# Patient Record
Sex: FEMALE | Race: WHITE
[De-identification: names, ages, dates, MRNs, and addresses within clinical notes are randomized per-mention and may not be internally consistent; named-entity substitution may affect disease eponyms.]

---

## 2017-09-22 ENCOUNTER — HOSPITAL ENCOUNTER (EMERGENCY)
Dept: HOSPITAL 10 - FTE | Age: 32
Discharge: HOME | End: 2017-09-22
Payer: MEDICAID

## 2017-09-22 VITALS — WEIGHT: 209.44 LBS | HEIGHT: 55 IN | BODY MASS INDEX: 48.47 KG/M2

## 2017-09-22 VITALS — HEART RATE: 90 BPM

## 2017-09-22 DIAGNOSIS — O99.89: Primary | ICD-10-CM

## 2017-09-22 DIAGNOSIS — R06.02: ICD-10-CM

## 2017-09-22 DIAGNOSIS — Z3A.16: ICD-10-CM

## 2017-09-22 PROCEDURE — 94664 DEMO&/EVAL PT USE INHALER: CPT

## 2017-09-22 NOTE — ERD
ER Documentation


Chief Complaint


Date/Time


DATE: 17 


TIME: 22:55


Chief Complaint


SOB 16 WEEKS PREG





HPI


This patient is a 32-year-old female who is  LC1 currently reportedly 16 

weeks pregnant presenting to the emergency department with complaints of 

shortness of breath since 3 PM today.  Associated symptoms include sore throat 

and bilateral ear pain.  Symptoms are constant and worsening.  She only takes 

medication for allergies but no other medication.  She denies fevers, chills, 

or other symptoms currently.





ROS


All systems reviewed and are negative except as per history of present illness.





Medications


Home Meds


Active Scripts


Fluticasone Propionate (Flonase Allergy Relief) 9.9 Ml [place].susp, 1 SPRAY 

NASAL DAILY, #1 BOTTLE


   TO EACH NOSTRIL


   Prov:TRISHA LIRA PA-C         17


Albuterol Sulfate* (Ventolin HFA*) 18 Gm Hfa.aer.ad, 2 PUFF INHALATION Q4H, #1 

INHALER


   Prov:TRISHA LIRA PA-C         17





PMhx/Soc


History of Surgery:  No ()


Hx Alcohol Use:  No


Hx Substance Use:  No


Hx Tobacco Use:  No


Smoking Status:  Never smoker





Physical Exam


Vitals





Vital Signs








  Date Time  Temp Pulse Resp B/P Pulse Ox O2 Delivery O2 Flow Rate FiO2


 


17 20:25  90   100 Room Air  


 


17 19:31  71 18  99   21


 


17 17:17 98.0 81 18 135/80 99   








Physical Exam


Const: Nontoxic, well-appearing female in no acute distress.


Head:   Atraumatic 


Eyes:    Normal Conjunctiva


ENT:    Normal External Ears, Nose and Mouth.


Neck:               Full range of motion..~ No meningismus.


Resp:   Shallow inspiratory effort, but clear lung sounds bilaterally.  No 

wheezing.  No rales, rhonchi, or crackles noted.  No signs of respiratory 

distress.  99% on room air.


Cardio:    Regular rate and rhythm, no murmurs


Abd:    Gravid abdomen, soft, non tender, non distended. Normal bowel sounds


Skin:    No petechiae or rashes


Back:    No midline or flank tenderness


Ext:    No cyanosis, or edema


Neur:    Awake and alert


Psych:    Normal Mood and Affect


Results 24 hrs





 Current Medications








 Medications


  (Trade)  Dose


 Ordered  Sig/Tessa


 Route


 PRN Reason  Start Time


 Stop Time Status Last Admin


Dose Admin


 


 Albuterol


  (Proventil


 0.083% (Neb))  2.5 mg  ONCE  STAT


 HHN


   17 19:21


 17 19:23 DC 17 19:31


 


 


 Ipratropium


 Bromide


  (Atrovent 0.02%


  (Neb))  0.5 mg  ONCE  ONCE


 HHN


   17 19:30


 17 19:31 DC 17 19:31


 


 


 Prednisone


  (Prednisone)  40 mg  ONCE  ONCE


 PO


   17 19:30


 17 19:31 DC 17 19:32


 











Procedures/MDM


32-year-old female presents to the emergency department with complaints of 

shortness of breath.  She is 16 weeks pregnant, but denies all obstetrical 

complaints.  She was given breathing treatment with ipratropium and albuterol 

in the department as well as p.o. prednisone and is feeling much improved on 

reevaluation.  Symptoms may be due to a asthma exacerbation.  Had very low 

suspicion for pulmonary embolism as the patient's vital signs were stable with 

a pulse of 81, temp of 98.0F, and 99% on room air.  She is feeling much 

improved after treatment in the department.  I do not feel she required further 

workup here.  She was advised to return immediately for any new or worsening 

symptoms and she demonstrated good understanding of this information.  Strict 

ER return precautions were discussed.  Close follow-up with the primary care 

physician was advised.  Suspicion for life-threatening illness at time of 

discharge.





Departure


Diagnosis:  


 Primary Impression:  


 Shortness of breath


Condition:  Fair


Patient Instructions:  Coping with Shortness of Breath: Controlling Stress


Referrals:  


COMMUNITY CLINIC  ()


Nella se ha hecho un examen mdico de control que le indica que no est en vonda 

condicin que requiera tratamiento urgente en el Departamento de Emergencia. Un 

estudio ms profundo y el tratamiento de londono condicin pueden esperar sin ningn 

riesgo hasta que usted sea atendida/o en el consultorio de londono mdico o vonda cl

asif. Es responsabilidad suya arreglar vonda rosy para el seguimiento del fartun. 





MANEJO DE CONDICIONES NO URGENTES EN EL FUTURO


1) Si usted tiene un mdico de atencin primaria:





Usted debera llamar a londono mdico de atencin primaria antes de venir al 

departamento de emergencia. Despus de las horas de consultorio, londono doctor o londono 

asociado/a est disponible por telfono. El mdico o enfermero de ariadne en el 

servicio telefnico puede asesorarle por octavio medio para atender el problema, o 

fartun contrario se puede programar vonda rosy.





2) Si usted no tiene un mdico de atencin primaria:


Llame al mdico o clnica de referencia que aparece abajo mckay las horas de 

consultorio para hacer vonda rosy para que le vean.





CLINICAS:


[facility]  [phone] [address] [facility]  [phone] [address] [facility] [phone]


[address] [facility]  [phone] [address] [facility]   [phone] [address] [facility] [phone] 


[address]





Additional Instructions:  


No mas mejor en 2-3 fletcher, regresar. Mas peor en 24 horas, regresear 

rapidamente. 





Ir a doctor primario in 5-7 fletcher. 





Usar instrucciones cuando lyn medicamento.











TRISHA LIRA PA-C Sep 22, 2017 23:02

## 2018-01-30 ENCOUNTER — HOSPITAL ENCOUNTER (EMERGENCY)
Age: 33
Discharge: HOME | End: 2018-01-30

## 2018-01-30 ENCOUNTER — HOSPITAL ENCOUNTER (EMERGENCY)
Dept: HOSPITAL 91 - E/R | Age: 33
Discharge: HOME | End: 2018-01-30
Payer: MEDICAID

## 2018-01-30 DIAGNOSIS — J02.9: Primary | ICD-10-CM

## 2018-01-30 PROCEDURE — 99283 EMERGENCY DEPT VISIT LOW MDM: CPT

## 2018-03-03 ENCOUNTER — HOSPITAL ENCOUNTER (INPATIENT)
Age: 33
LOS: 3 days | Discharge: HOME | End: 2018-03-06

## 2018-03-03 ENCOUNTER — HOSPITAL ENCOUNTER (INPATIENT)
Dept: HOSPITAL 91 - OBT | Age: 33
LOS: 3 days | Discharge: HOME | End: 2018-03-06
Payer: MEDICAID

## 2018-03-03 DIAGNOSIS — O34.211: Primary | ICD-10-CM

## 2018-03-03 DIAGNOSIS — Z3A.39: ICD-10-CM

## 2018-03-03 LAB
ADD MAN DIFF?: NO
BASOPHIL #: 0 10^3/UL (ref 0–0.1)
BASOPHILS %: 0.3 % (ref 0–2)
EOSINOPHILS #: 0.1 10^3/UL (ref 0–0.5)
EOSINOPHILS %: 1.5 % (ref 0–7)
HEMATOCRIT: 36.8 % (ref 37–47)
HEMOGLOBIN: 12.3 G/DL (ref 12–16)
HEPATITIS B SURFACE ANTIGEN: NEGATIVE
INR: 0.93
LYMPHOCYTES #: 1.8 10^3/UL (ref 0.8–2.9)
LYMPHOCYTES %: 19.3 % (ref 15–51)
MEAN CORPUSCULAR HEMOGLOBIN: 30.8 PG (ref 29–33)
MEAN CORPUSCULAR HGB CONC: 33.4 G/DL (ref 32–37)
MEAN CORPUSCULAR VOLUME: 92.2 FL (ref 82–101)
MEAN PLATELET VOLUME: 11.3 FL (ref 7.4–10.4)
MONOCYTE #: 0.7 10^3/UL (ref 0.3–0.9)
MONOCYTES %: 7.9 % (ref 0–11)
NEUTROPHIL #: 6.5 10^3/UL (ref 1.6–7.5)
NEUTROPHILS %: 70.5 % (ref 39–77)
NUCLEATED RED BLOOD CELLS #: 0 10^3/UL (ref 0–0)
NUCLEATED RED BLOOD CELLS%: 0 /100WBC (ref 0–0)
PARTIAL THROMBOPLASTIN TIME: 25 SEC (ref 25–35)
PLATELET COUNT: 188 10^3/UL (ref 140–415)
PROTIME: 12.6 SEC (ref 11.9–14.9)
PT RATIO: 1
RAPID PLASMA REAGIN: NONREACTIVE
RED BLOOD COUNT: 3.99 10^6/UL (ref 4.2–5.4)
RED CELL DISTRIBUTION WIDTH: 13.8 % (ref 11.5–14.5)
RUPTURE FETAL MEMBRANES: POSITIVE
WHITE BLOOD COUNT: 9.2 10^3/UL (ref 4.8–10.8)

## 2018-03-03 PROCEDURE — 86901 BLOOD TYPING SEROLOGIC RH(D): CPT

## 2018-03-03 PROCEDURE — 76818 FETAL BIOPHYS PROFILE W/NST: CPT

## 2018-03-03 PROCEDURE — 80307 DRUG TEST PRSMV CHEM ANLYZR: CPT

## 2018-03-03 PROCEDURE — 87340 HEPATITIS B SURFACE AG IA: CPT

## 2018-03-03 PROCEDURE — 76815 OB US LIMITED FETUS(S): CPT

## 2018-03-03 PROCEDURE — 85730 THROMBOPLASTIN TIME PARTIAL: CPT

## 2018-03-03 PROCEDURE — 86592 SYPHILIS TEST NON-TREP QUAL: CPT

## 2018-03-03 PROCEDURE — 3E033VJ INTRODUCTION OF OTHER HORMONE INTO PERIPHERAL VEIN, PERCUTANEOUS APPROACH: ICD-10-PCS

## 2018-03-03 PROCEDURE — 86850 RBC ANTIBODY SCREEN: CPT

## 2018-03-03 PROCEDURE — 85610 PROTHROMBIN TIME: CPT

## 2018-03-03 PROCEDURE — 86920 COMPATIBILITY TEST SPIN: CPT

## 2018-03-03 PROCEDURE — 85025 COMPLETE CBC W/AUTO DIFF WBC: CPT

## 2018-03-03 PROCEDURE — 84112 EVAL AMNIOTIC FLUID PROTEIN: CPT

## 2018-03-03 PROCEDURE — 86900 BLOOD TYPING SEROLOGIC ABO: CPT

## 2018-03-03 PROCEDURE — 94760 N-INVAS EAR/PLS OXIMETRY 1: CPT

## 2018-03-03 RX ADMIN — SODIUM CITRATE AND CITRIC ACID MONOHYDRATE 1 ML: 500; 334 SOLUTION ORAL at 16:40

## 2018-03-03 RX ADMIN — AMPICILLIN 1 MLS/HR: 1 INJECTION, POWDER, FOR SOLUTION INTRAMUSCULAR; INTRAVENOUS at 18:30

## 2018-03-03 RX ADMIN — Medication 1 MLS/HR: at 18:15

## 2018-03-03 RX ADMIN — CARBOPROST TROMETHAMINE 1 MCG: 250 INJECTION, SOLUTION INTRAMUSCULAR at 18:56

## 2018-03-03 RX ADMIN — Medication 1 MLS/HR: at 19:10

## 2018-03-03 RX ADMIN — METHYLERGONOVINE MALEATE 1 MG: 0.2 INJECTION, SOLUTION INTRAMUSCULAR; INTRAVENOUS at 18:53

## 2018-03-03 RX ADMIN — PYRIDOXINE HYDROCHLORIDE 1 MLS/HR: 100 INJECTION, SOLUTION INTRAMUSCULAR; INTRAVENOUS at 06:46

## 2018-03-03 RX ADMIN — AMPICILLIN 1 MLS/HR: 1 INJECTION, POWDER, FOR SOLUTION INTRAMUSCULAR; INTRAVENOUS at 14:30

## 2018-03-03 RX ADMIN — ONDANSETRON HYDROCHLORIDE 1 MG: 2 INJECTION, SOLUTION INTRAMUSCULAR; INTRAVENOUS at 16:40

## 2018-03-03 RX ADMIN — MISOPROSTOL 1 MCG: 200 TABLET ORAL at 18:59

## 2018-03-03 RX ADMIN — PYRIDOXINE HYDROCHLORIDE 1 MLS/HR: 100 INJECTION, SOLUTION INTRAMUSCULAR; INTRAVENOUS at 14:55

## 2018-03-03 RX ADMIN — AMPICILLIN 1 MLS/HR: 1 INJECTION, POWDER, FOR SOLUTION INTRAMUSCULAR; INTRAVENOUS at 12:11

## 2018-03-03 RX ADMIN — CEFAZOLIN SODIUM 1 MLS/HR: 2 SOLUTION INTRAVENOUS at 19:52

## 2018-03-03 RX ADMIN — AMPICILLIN 1 MLS/HR: 2 INJECTION, POWDER, FOR SOLUTION INTRAVENOUS at 06:46

## 2018-03-03 RX ADMIN — Medication 1 MLS/HR: at 21:49

## 2018-03-04 LAB
ADD MAN DIFF?: NO
BASOPHIL #: 0 10^3/UL (ref 0–0.1)
BASOPHILS %: 0.2 % (ref 0–2)
EOSINOPHILS #: 0.1 10^3/UL (ref 0–0.5)
EOSINOPHILS %: 0.5 % (ref 0–7)
HEMATOCRIT: 28.6 % (ref 37–47)
HEMOGLOBIN: 9.7 G/DL (ref 12–16)
LYMPHOCYTES #: 1.4 10^3/UL (ref 0.8–2.9)
LYMPHOCYTES %: 10.3 % (ref 15–51)
MEAN CORPUSCULAR HEMOGLOBIN: 31.5 PG (ref 29–33)
MEAN CORPUSCULAR HGB CONC: 33.9 G/DL (ref 32–37)
MEAN CORPUSCULAR VOLUME: 92.9 FL (ref 82–101)
MEAN PLATELET VOLUME: 12 FL (ref 7.4–10.4)
MONOCYTE #: 1 10^3/UL (ref 0.3–0.9)
MONOCYTES %: 7.6 % (ref 0–11)
NEUTROPHIL #: 10.9 10^3/UL (ref 1.6–7.5)
NEUTROPHILS %: 80.9 % (ref 39–77)
NUCLEATED RED BLOOD CELLS #: 0 10^3/UL (ref 0–0)
NUCLEATED RED BLOOD CELLS%: 0 /100WBC (ref 0–0)
PLATELET COUNT: 165 10^3/UL (ref 140–415)
RED BLOOD COUNT: 3.08 10^6/UL (ref 4.2–5.4)
RED CELL DISTRIBUTION WIDTH: 13.8 % (ref 11.5–14.5)
WHITE BLOOD COUNT: 13.5 10^3/UL (ref 4.8–10.8)

## 2018-03-04 RX ADMIN — CEFAZOLIN SODIUM 1 MLS/HR: 2 SOLUTION INTRAVENOUS at 22:19

## 2018-03-04 RX ADMIN — IBUPROFEN 1 MG: 800 TABLET, FILM COATED ORAL at 22:19

## 2018-03-04 RX ADMIN — PYRIDOXINE HYDROCHLORIDE 1 MLS/HR: 100 INJECTION, SOLUTION INTRAMUSCULAR; INTRAVENOUS at 14:26

## 2018-03-04 RX ADMIN — PYRIDOXINE HYDROCHLORIDE 1 MLS/HR: 100 INJECTION, SOLUTION INTRAMUSCULAR; INTRAVENOUS at 22:19

## 2018-03-04 RX ADMIN — CLINDAMYCIN HYDROCHLORIDE 1 MG: 300 CAPSULE ORAL at 06:32

## 2018-03-04 RX ADMIN — KETOROLAC TROMETHAMINE 1 MG: 30 INJECTION, SOLUTION INTRAMUSCULAR at 13:34

## 2018-03-04 RX ADMIN — PYRIDOXINE HYDROCHLORIDE 1 MLS/HR: 100 INJECTION, SOLUTION INTRAMUSCULAR; INTRAVENOUS at 06:26

## 2018-03-04 RX ADMIN — CLINDAMYCIN HYDROCHLORIDE 1 MG: 300 CAPSULE ORAL at 00:52

## 2018-03-04 RX ADMIN — Medication 1 MG: at 11:38

## 2018-03-04 RX ADMIN — CEFAZOLIN SODIUM 1 MLS/HR: 2 SOLUTION INTRAVENOUS at 13:34

## 2018-03-04 RX ADMIN — CLINDAMYCIN HYDROCHLORIDE 1 MG: 300 CAPSULE ORAL at 12:43

## 2018-03-04 RX ADMIN — DOCUSATE SODIUM AND SENNOSIDES 1 TAB: 8.6; 5 TABLET, FILM COATED ORAL at 21:00

## 2018-03-04 RX ADMIN — CLINDAMYCIN HYDROCHLORIDE 1 MG: 300 CAPSULE ORAL at 23:33

## 2018-03-04 RX ADMIN — PYRIDOXINE HYDROCHLORIDE 1 MLS/HR: 100 INJECTION, SOLUTION INTRAMUSCULAR; INTRAVENOUS at 00:52

## 2018-03-04 RX ADMIN — DOCUSATE SODIUM AND SENNOSIDES 1 TAB: 8.6; 5 TABLET, FILM COATED ORAL at 09:05

## 2018-03-04 RX ADMIN — KETOROLAC TROMETHAMINE 1 MG: 30 INJECTION, SOLUTION INTRAMUSCULAR at 06:33

## 2018-03-04 RX ADMIN — CEFAZOLIN SODIUM 1 MLS/HR: 2 SOLUTION INTRAVENOUS at 00:56

## 2018-03-04 RX ADMIN — CLINDAMYCIN HYDROCHLORIDE 1 MG: 300 CAPSULE ORAL at 18:02

## 2018-03-04 RX ADMIN — CEFAZOLIN SODIUM 1 MLS/HR: 2 SOLUTION INTRAVENOUS at 06:33

## 2018-03-04 RX ADMIN — Medication 7 APPLIC: at 00:52

## 2018-03-04 RX ADMIN — FLUTICASONE PROPIONATE 1 SPRAY: 50 SPRAY, METERED NASAL at 09:00

## 2018-03-05 LAB
ADD MAN DIFF?: NO
BASOPHIL #: 0 10^3/UL (ref 0–0.1)
BASOPHILS %: 0.2 % (ref 0–2)
EOSINOPHILS #: 0.2 10^3/UL (ref 0–0.5)
EOSINOPHILS %: 1.2 % (ref 0–7)
HEMATOCRIT: 30.2 % (ref 37–47)
HEMOGLOBIN: 10 G/DL (ref 12–16)
LYMPHOCYTES #: 1.7 10^3/UL (ref 0.8–2.9)
LYMPHOCYTES %: 13.5 % (ref 15–51)
MEAN CORPUSCULAR HEMOGLOBIN: 31.1 PG (ref 29–33)
MEAN CORPUSCULAR HGB CONC: 33.1 G/DL (ref 32–37)
MEAN CORPUSCULAR VOLUME: 93.8 FL (ref 82–101)
MEAN PLATELET VOLUME: 11.4 FL (ref 7.4–10.4)
MONOCYTE #: 0.9 10^3/UL (ref 0.3–0.9)
MONOCYTES %: 7.4 % (ref 0–11)
NEUTROPHIL #: 9.6 10^3/UL (ref 1.6–7.5)
NEUTROPHILS %: 77.2 % (ref 39–77)
NUCLEATED RED BLOOD CELLS #: 0 10^3/UL (ref 0–0)
NUCLEATED RED BLOOD CELLS%: 0 /100WBC (ref 0–0)
PLATELET COUNT: 184 10^3/UL (ref 140–415)
RED BLOOD COUNT: 3.22 10^6/UL (ref 4.2–5.4)
RED CELL DISTRIBUTION WIDTH: 14.2 % (ref 11.5–14.5)
WHITE BLOOD COUNT: 12.4 10^3/UL (ref 4.8–10.8)

## 2018-03-05 RX ADMIN — CLINDAMYCIN HYDROCHLORIDE 1 MG: 300 CAPSULE ORAL at 17:21

## 2018-03-05 RX ADMIN — PYRIDOXINE HYDROCHLORIDE 1 MLS/HR: 100 INJECTION, SOLUTION INTRAMUSCULAR; INTRAVENOUS at 14:26

## 2018-03-05 RX ADMIN — PYRIDOXINE HYDROCHLORIDE 1 MLS/HR: 100 INJECTION, SOLUTION INTRAMUSCULAR; INTRAVENOUS at 06:26

## 2018-03-05 RX ADMIN — CLINDAMYCIN HYDROCHLORIDE 1 MG: 300 CAPSULE ORAL at 05:36

## 2018-03-05 RX ADMIN — CLINDAMYCIN HYDROCHLORIDE 1 MG: 300 CAPSULE ORAL at 12:15

## 2018-03-05 RX ADMIN — DOCUSATE SODIUM AND SENNOSIDES 1 TAB: 8.6; 5 TABLET, FILM COATED ORAL at 22:02

## 2018-03-05 RX ADMIN — DOCUSATE SODIUM AND SENNOSIDES 1 TAB: 8.6; 5 TABLET, FILM COATED ORAL at 09:34

## 2018-03-05 RX ADMIN — FLUTICASONE PROPIONATE 1 SPRAY: 50 SPRAY, METERED NASAL at 09:00

## 2018-03-05 RX ADMIN — IBUPROFEN 1 MG: 800 TABLET, FILM COATED ORAL at 05:36

## 2018-03-05 RX ADMIN — IBUPROFEN 1 MG: 800 TABLET, FILM COATED ORAL at 13:29

## 2018-03-05 RX ADMIN — CEFAZOLIN SODIUM 1 MLS/HR: 2 SOLUTION INTRAVENOUS at 05:36

## 2018-03-05 RX ADMIN — CEFAZOLIN SODIUM 1 MLS/HR: 2 SOLUTION INTRAVENOUS at 13:30

## 2018-03-05 RX ADMIN — IBUPROFEN 1 MG: 800 TABLET, FILM COATED ORAL at 22:03

## 2018-03-06 RX ADMIN — IBUPROFEN 1 MG: 800 TABLET, FILM COATED ORAL at 05:38

## 2018-03-06 RX ADMIN — MEASLES, MUMPS, AND RUBELLA VIRUS VACCINE LIVE 1 ML: 1000; 12500; 1000 INJECTION, POWDER, LYOPHILIZED, FOR SUSPENSION SUBCUTANEOUS at 09:48

## 2018-03-06 RX ADMIN — CLINDAMYCIN HYDROCHLORIDE 1 MG: 300 CAPSULE ORAL at 12:56

## 2018-03-06 RX ADMIN — CLINDAMYCIN HYDROCHLORIDE 1 MG: 300 CAPSULE ORAL at 00:10

## 2018-03-06 RX ADMIN — IBUPROFEN 1 MG: 800 TABLET, FILM COATED ORAL at 14:59

## 2018-03-06 RX ADMIN — FLUTICASONE PROPIONATE 1 SPRAY: 50 SPRAY, METERED NASAL at 09:00

## 2018-03-06 RX ADMIN — CLINDAMYCIN HYDROCHLORIDE 1 MG: 300 CAPSULE ORAL at 05:38

## 2018-03-06 RX ADMIN — CLOSTRIDIUM TETANI TOXOID ANTIGEN (FORMALDEHYDE INACTIVATED), CORYNEBACTERIUM DIPHTHERIAE TOXOID ANTIGEN (FORMALDEHYDE INACTIVATED), BORDETELLA PERTUSSIS TOXOID ANTIGEN (GLUTARALDEHYDE INACTIVATED), BORDETELLA PERTUSSIS FILAMENTOUS HEMAGGLUTININ ANTIGEN (FORMALDEHYDE INACTIVATED), BORDETELLA PERTUSSIS PERTACTIN ANTIGEN, AND BORDETELLA PERTUSSIS FIMBRIAE 2/3 ANTIGEN 1 ML: 5; 2; 2.5; 5; 3; 5 INJECTION, SUSPENSION INTRAMUSCULAR at 09:46

## 2018-03-06 RX ADMIN — DOCUSATE SODIUM AND SENNOSIDES 1 TAB: 8.6; 5 TABLET, FILM COATED ORAL at 09:23

## 2018-05-10 ENCOUNTER — HOSPITAL ENCOUNTER (EMERGENCY)
Age: 33
Discharge: HOME | End: 2018-05-10

## 2018-05-10 ENCOUNTER — HOSPITAL ENCOUNTER (EMERGENCY)
Dept: HOSPITAL 91 - E/R | Age: 33
Discharge: HOME | End: 2018-05-10
Payer: MEDICAID

## 2018-05-10 DIAGNOSIS — B30.9: Primary | ICD-10-CM

## 2018-05-10 PROCEDURE — 99283 EMERGENCY DEPT VISIT LOW MDM: CPT

## 2019-06-22 ENCOUNTER — HOSPITAL ENCOUNTER (EMERGENCY)
Dept: HOSPITAL 10 - FTE | Age: 34
Discharge: HOME | End: 2019-06-22
Payer: MEDICAID

## 2019-06-22 ENCOUNTER — HOSPITAL ENCOUNTER (EMERGENCY)
Dept: HOSPITAL 91 - FTE | Age: 34
Discharge: HOME | End: 2019-06-22
Payer: MEDICAID

## 2019-06-22 VITALS
HEIGHT: 65 IN | HEIGHT: 65 IN | WEIGHT: 215.39 LBS | BODY MASS INDEX: 35.89 KG/M2 | WEIGHT: 215.39 LBS | BODY MASS INDEX: 35.89 KG/M2

## 2019-06-22 VITALS — DIASTOLIC BLOOD PRESSURE: 72 MMHG | RESPIRATION RATE: 18 BRPM | HEART RATE: 75 BPM | SYSTOLIC BLOOD PRESSURE: 126 MMHG

## 2019-06-22 DIAGNOSIS — J06.9: Primary | ICD-10-CM

## 2019-06-22 PROCEDURE — 99282 EMERGENCY DEPT VISIT SF MDM: CPT

## 2019-06-22 NOTE — ERD
ER Documentation


Chief Complaint


Chief Complaint





cough/congestion x 3 days





HPI


34-year-old female with no significant past medical history presents for cough 


and congestion x3 days.  Patient also has some sore throat.  The cough is noted 


to be dry.  Denies any fevers or chills.  Denies chest pain or shortness of amy


ath.  Denies abdominal pain, nausea, vomiting.  No other modifying factors 


noted, no treatments tried at home.





ROS


All systems reviewed and are negative except as per history of present illness.





Medications


Home Meds


Active Scripts


D-Methorphan Hb/P-Epd HCl/Bpm (Dnzqjwcyaz-Jqnuosnarpx-Qn Syr) 118 Ml Syrup, 5 ML


PO Q4H PRN for COUGH for 10 Days, #1 BOTTLE


   Prov:MARIBELL RICHARDSON DO         19


Polymyxin B Sulfate-TMP* (Polymyxin B-TMP Eye Drops*) 10 Ml Drops, 1 DROP BOTH 


EYES QID for 7 Days, EA


   Prov:RAIMUNDO WOODY MD         5/10/18


Ibuprofen* (Ibuprofen*) 800 Mg Tablet, 800 MG PO Q8, #60 TAB 0 Refills


   Prov:REBA MCGREGOR MD         3/5/18


Fluticasone Propionate (Flonase Allergy Relief) 9.9 Ml [place].susp, 1 SPRAY NASAL


DAILY, #1 BOTTLE


   TO EACH NOSTRIL


   Prov:RODRIGO BAXTER PA-C         17


Albuterol Sulfate* (Ventolin HFA*) 18 Gm Hfa.aer.ad, 2 PUFF INHALATION Q4H, #1 


INHALER


   Prov:RODRIGO BAXTER PA-C         17


Reported Medications


Prenatal Vit-Iron Fumarate-FA (Prenatal Tablet) 1 Each Tablet, 1 EACH BU DAILY


   14





Allergies


Allergies:  


Coded Allergies:  


     No Known Allergies (Unverified  Allergy, Unknown, 19)





PMhx/Soc


History of Surgery:  No ()


Anesthesia Reaction:  No


Hx Neurological Disorder:  No


Hx Respiratory Disorders:  No


Hx Cardiac Disorders:  No


Hx Psychiatric Problems:  No


Hx Miscellaneous Medical Probl:  No


Hx Alcohol Use:  No


Hx Substance Use:  No


Hx Tobacco Use:  No


Smoking Status:  Never smoker





FmHx


Family History:  No coronary disease





Physical Exam


Vitals





Vital Signs


  Date      Temp  Pulse  Resp  B/P (MAP)   Pulse Ox  O2          O2 Flow    FiO2


Time                                                 Delivery    Rate


   19  97.5     75    18      126/72        97  Room Air


     15:01                           (90)


   19  97.9     75    18      136/90        99


     12:40                          (105)





Physical Exam


Const:   No acute distress


Head:   Atraumatic 


Eyes:    Normal Conjunctiva


ENT:    Normal External Ears, bilateral tympanic membrane intact without 


erythema or bulging noted, nose and Mouth examination normal, no tonsillar 


swelling or exudate noted


Neck:               Full range of motion. No meningismus.


Resp:   Clear to auscultation bilaterally, no wheezing, rales, rhonchi


Cardio:   Regular rate and rhythm, no murmurs


Skin:   No petechiae or rashes


Ext:    No cyanosis, or edema


Neur:   Awake and alert


Psych:    Normal Mood and Affect





Procedures/MDM


Medical Decision Making:





Differential diagnosis includes but not limited to upper respiratory infection, 


pneumonia, sepsis, meningitis, influenza.





Patient appeared well on physical examination, nontoxic appearing.  Lungs were 


clear to auscultation bilaterally.  There is low suspicion for pneumonia, 


sepsis, meningitis.


Patient likely has an upper respiratory infection, likely viral. Therefore 


antibiotics not indicated.


Discussed symptomatic treatment with patient who agrees with plan.





Patient given prescription for supportive medication(s).





Patient advised to follow up with PCP in 1-2 days. Patient advised to return to 


ED for new or worsening symptoms. Patient stable on discharge from the ED.











Disclaimer: Inadvertent spelling and grammatical errors are likely due to 


EHR/dictation software use and do not reflect on the overall quality of patient 


care. Also, please note that the electronic time recorded on this note does not 


necessarily reflect the actual time of the patient encounter.





Departure


Diagnosis:  


   Primary Impression:  


   URI (upper respiratory infection)


   URI type:  unspecified URI  Qualified Codes:  J06.9 - Acute upper respiratory


   infection, unspecified


Condition:  Fair


Patient Instructions:  Preventing Common Respiratory Infections


Referrals:  


COMM[facility]ITY CLINICS


YOU HAVE RECEIVED A MEDICAL SCREENING EXAM AND THE RESULTS INDICATE THAT YOU DO 


NOT HAVE A CONDITION THAT REQUIRES URGENT TREATMENT IN THE EMERGENCY DEPARTMENT.





FURTHER EVALUATION AND TREATMENT OF YOUR CONDITION CAN WAIT UNTIL YOU ARE SEEN 


IN YOUR DOCTORS OFFICE WITHIN THE NEXT 1-2 DAYS. IT IS YOUR RESPONSIBILITY TO 


MAKE AN APPOINTMENT FOR FOLOW-UP CARE.





IF YOU HAVE A PRIMARY DOCTOR


--you should call your primary doctor and schedule an appointment





IF YOU DO NOT HAVE A PRIMARY DOCTOR YOU CAN CALL OUR PHYSICIAN REFERRAL HOTLINE 


AT


 [phone] 





IF YOU CAN NOT AFFORD TO SEE A PHYSICIAN YOU CAN CHOSE FROM THE FOLLOWING 


[facility]





[facility] [phone] [address] [facility] [phone] [address] [facility]. [facility] [phone] [address] [facility] [phone] [address] [facility] [phone] [address] [facility]. [facility]. [phone] [address]





Additional Instructions:  


Llame al doctor MAANA y damon bernie PAO PARA DENTRO DE 1-2 LOZADA.Dgale a la 


secretaria que nosotros le instruimos hacer esta pao.Avise o llame si mosley 


condicin se empeora antes de la pao. Regresa aqui si peor o no mejor.











MARIBELL RICHARDSON DO                 2019 14:43